# Patient Record
(demographics unavailable — no encounter records)

---

## 2025-06-03 NOTE — PROCEDURE
[Posterior Lesion] : posterior lesion [Anterior rhinoscopy insufficient to account for symptoms] : anterior rhinoscopy insufficient to account for symptoms [Topical Lidocaine] : topical lidocaine [Oxymetazoline HCl] : oxymetazoline HCl [Flexible Endoscope] : examined with the flexible endoscope [Serial Number: ___] : Serial Number: [unfilled] [Congested] : congested [Deviated to the Rt] : deviated to the right [] : purulence present bilateral [Normal] : the nasopharynx was normal [de-identified] : done due to inability to see posterior nasal cavity with speculum- purulence from omu on r [FreeTextEntry6] : done for inability to visualize posterior nasal cavity with speculum

## 2025-06-03 NOTE — PROCEDURE
[Posterior Lesion] : posterior lesion [Anterior rhinoscopy insufficient to account for symptoms] : anterior rhinoscopy insufficient to account for symptoms [Topical Lidocaine] : topical lidocaine [Oxymetazoline HCl] : oxymetazoline HCl [Flexible Endoscope] : examined with the flexible endoscope [Serial Number: ___] : Serial Number: [unfilled] [Congested] : congested [Deviated to the Rt] : deviated to the right [] : purulence present bilateral [Normal] : the nasopharynx was normal [de-identified] : done due to inability to see posterior nasal cavity with speculum- purulence from omu on r [FreeTextEntry6] : done for inability to visualize posterior nasal cavity with speculum

## 2025-06-03 NOTE — HISTORY OF PRESENT ILLNESS
[de-identified] : 2 yr & 9 month F/U visit for this 91yo F with h/o b snhl, chronic sinusitis, allergic rhinitis, & cerumen impaction. She c/o r eye pain if touches above and around r eye. She has not had covid test. Nose is obstructed. Does not feel she has a uri. Saw her ophthalmologist and was told eyes were normal and vision was unaffected. She was away on a trip with a person who had acute uri.

## 2025-06-03 NOTE — ASSESSMENT
[FreeTextEntry1] : sinusitis augmentin flonase rtc with ct due to eye pain rec to go to e.r. if worsens elevate hob and to go to urgent care for covid, flu and upper resp pathogen testing after this visit - she agreed

## 2025-06-03 NOTE — PHYSICAL EXAM
[Nasal Endoscopy Performed] : nasal endoscopy was performed, see procedure section for findings [de-identified] : engorged [Midline] : trachea located in midline position [Normal] : no rashes [de-identified] : gait steady

## 2025-06-03 NOTE — PROCEDURE
[Posterior Lesion] : posterior lesion [Anterior rhinoscopy insufficient to account for symptoms] : anterior rhinoscopy insufficient to account for symptoms [Topical Lidocaine] : topical lidocaine [Oxymetazoline HCl] : oxymetazoline HCl [Flexible Endoscope] : examined with the flexible endoscope [Serial Number: ___] : Serial Number: [unfilled] [Congested] : congested [Deviated to the Rt] : deviated to the right [] : purulence present bilateral [Normal] : the nasopharynx was normal [de-identified] : done due to inability to see posterior nasal cavity with speculum- purulence from omu on r [FreeTextEntry6] : done for inability to visualize posterior nasal cavity with speculum

## 2025-06-03 NOTE — PHYSICAL EXAM
[Nasal Endoscopy Performed] : nasal endoscopy was performed, see procedure section for findings [de-identified] : engorged [Midline] : trachea located in midline position [Normal] : no rashes [de-identified] : gait steady

## 2025-06-03 NOTE — PHYSICAL EXAM
[Nasal Endoscopy Performed] : nasal endoscopy was performed, see procedure section for findings [de-identified] : engorged [Midline] : trachea located in midline position [Normal] : no rashes [de-identified] : gait steady

## 2025-06-03 NOTE — HISTORY OF PRESENT ILLNESS
[de-identified] : 2 yr & 9 month F/U visit for this 91yo F with h/o b snhl, chronic sinusitis, allergic rhinitis, & cerumen impaction. She c/o r eye pain if touches above and around r eye. She has not had covid test. Nose is obstructed. Does not feel she has a uri. Saw her ophthalmologist and was told eyes were normal and vision was unaffected. She was away on a trip with a person who had acute uri.

## 2025-06-03 NOTE — HISTORY OF PRESENT ILLNESS
[de-identified] : 2 yr & 9 month F/U visit for this 89yo F with h/o b snhl, chronic sinusitis, allergic rhinitis, & cerumen impaction. She c/o r eye pain if touches above and around r eye. She has not had covid test. Nose is obstructed. Does not feel she has a uri. Saw her ophthalmologist and was told eyes were normal and vision was unaffected. She was away on a trip with a person who had acute uri.

## 2025-06-14 NOTE — ASSESSMENT
[FreeTextEntry1] : 1. likely odontogenic cause of sinusitis r maxillary with possible foreign body noted to r maxillary -CT: opacification r maxillary sinus, bony dehiscence inferior and medial right maxillary sinus bony wall, oral antral fistulas, left DNS - images reviewed with pt -significant clinical improvement after augmentin and she states she is no longer symptomatic. After reviewing ct more antibiotic recommended and explained she would typically need to have surgical procedure to clear implant, fistula, and possibly sinus to clear dz. She absolutely refused more abx. Importance of seeing OMFS and reason stressed -she said she would not promise she would agree to a surgical procedure but agreed to see OMFS for consultation; she said she could not get to original surgeon performing implant in [place]. Recommended to continue flonase and to call or go to ER if sx worsen. Eye risk explained as she had sx there when acutely infected. Elevate hob.  asst asked to set up appt with Dr Costa and follow up with me after  2. b cerumen impaction -b copious cerumen noted, atraumatically removed with suction, b tm intact -ears felt better -avoid qtip use  3. snhl -audio exam: hf snhl AU, type A tymps AU -results reviewed with patient -advised arpita

## 2025-06-14 NOTE — PHYSICAL EXAM
[Nasal Endoscopy Performed] : nasal endoscopy was performed, see procedure section for findings [Midline] : trachea located in midline position [Normal] : assessment of respiratory effort is normal [de-identified] : b copious cerumen noted, atraumatically removed with suction, b tm intact

## 2025-06-14 NOTE — HISTORY OF PRESENT ILLNESS
[de-identified] : 10 day follow up visit for this 89 y/o F with h/o b snhl, chronic sinusitis, allergic rhinitis and recurrent cerumen impaction. She was dx'd with sinusitis during last visit and tx'd with augmentin with relief of r eye pain; she feels her sinusitis has resolved. She also is concerned of wax buildup bilaterally - has applied debrox prior to visit. She is interested in obtaining hearing aids. She has h/o dental implants several years ago - Dr. Chacorta Weeks (periodontist),

## 2025-06-14 NOTE — HISTORY OF PRESENT ILLNESS
[de-identified] : 10 day follow up visit for this 89 y/o F with h/o b snhl, chronic sinusitis, allergic rhinitis and recurrent cerumen impaction. She was dx'd with sinusitis during last visit and tx'd with augmentin with relief of r eye pain; she feels her sinusitis has resolved. She also is concerned of wax buildup bilaterally - has applied debrox prior to visit. She is interested in obtaining hearing aids. She has h/o dental implants several years ago - Dr. Chacorta Weeks (periodontist),

## 2025-06-14 NOTE — DATA REVIEWED
[de-identified] : b symm hf snhl some progression from 2016 reviewed with pt tymps As reviewed with pt [de-identified] : ct images reviewed with pt 1. Severe opacification right maxillary sinus with superimposed, moderate-sized right maxillary sinus air-fluid level and scattered, probable sinoliths, bony thickening of the right maxillary sinus bony walls suggesting sequelae of chronic sinusitis, focal bony dehiscence inferior and medial right maxillary sinus bony walls, oral antral fistulas and likely source of odontogenic, right maxillary sinusitis as above. 2. Moderate right and mild/moderate left ethmoid, mild/moderate left and mild right sphenoid sinus opacification without evidence of acute sinusitis. 3. Leftward nasal septal deviation, moderate anterior nasal septal and moderate left greater than right inferior nasal turbinate hypertrophy and nonspecific moderate right lateral middle nasal cavity mucosal thickening and normal nasal cavity anatomic variants.

## 2025-06-14 NOTE — DATA REVIEWED
[de-identified] : b symm hf snhl some progression from 2016 reviewed with pt tymps As reviewed with pt [de-identified] : ct images reviewed with pt 1. Severe opacification right maxillary sinus with superimposed, moderate-sized right maxillary sinus air-fluid level and scattered, probable sinoliths, bony thickening of the right maxillary sinus bony walls suggesting sequelae of chronic sinusitis, focal bony dehiscence inferior and medial right maxillary sinus bony walls, oral antral fistulas and likely source of odontogenic, right maxillary sinusitis as above. 2. Moderate right and mild/moderate left ethmoid, mild/moderate left and mild right sphenoid sinus opacification without evidence of acute sinusitis. 3. Leftward nasal septal deviation, moderate anterior nasal septal and moderate left greater than right inferior nasal turbinate hypertrophy and nonspecific moderate right lateral middle nasal cavity mucosal thickening and normal nasal cavity anatomic variants.

## 2025-06-14 NOTE — ASSESSMENT
[FreeTextEntry1] : 1. likely odontogenic cause of sinusitis r maxillary with possible foreign body noted to r maxillary -CT: opacification r maxillary sinus, bony dehiscence inferior and medial right maxillary sinus bony wall, oral antral fistulas, left DNS - images reviewed with pt -significant clinical improvement after augmentin and she states she is no longer symptomatic. After reviewing ct more antibiotic recommended and explained she would typically need to have surgical procedure to clear implant, fistula, and possibly sinus to clear dz. She absolutely refused more abx. Importance of seeing OMFS and reason stressed -she said she would not promise she would agree to a surgical procedure but agreed to see OMFS for consultation; she said she could not get to original surgeon performing implant in [place]. Recommended to continue flonase and to call or go to ER if sx worsen. Eye risk explained as she had sx there when acutely infected. Elevate hob.  asst asked to set up appt with Dr Costa and follow up with me after  2. b cerumen impaction -b copious cerumen noted, atraumatically removed with suction, b tm intact -ears felt better -avoid qtip use  3. snhl -audio exam: hf snhl AU, type A tymps AU -results reviewed with patient -advised arpita

## 2025-06-14 NOTE — PROCEDURE
[Posterior Lesion] : posterior lesion [Anterior rhinoscopy insufficient to account for symptoms] : anterior rhinoscopy insufficient to account for symptoms [Topical Lidocaine] : topical lidocaine [Oxymetazoline HCl] : oxymetazoline HCl [Flexible Endoscope] : examined with the flexible endoscope [Serial Number: ___] : Serial Number: [unfilled] [Normal] : the nasopharynx was normal [de-identified] : performed to recheck omus purulence seen at last visit and significant dz on ct - could not see with speculum [Cerumen Impaction] : Cerumen Impaction [Same] : same as the Pre Op Dx. [] : Removal of Cerumen [FreeTextEntry6] : b copious cerumen impaction removed atraumatically with suction, duran nl

## 2025-06-14 NOTE — PHYSICAL EXAM
[Nasal Endoscopy Performed] : nasal endoscopy was performed, see procedure section for findings [Midline] : trachea located in midline position [Normal] : assessment of respiratory effort is normal [de-identified] : b copious cerumen noted, atraumatically removed with suction, b tm intact

## 2025-06-14 NOTE — PROCEDURE
[Posterior Lesion] : posterior lesion [Anterior rhinoscopy insufficient to account for symptoms] : anterior rhinoscopy insufficient to account for symptoms [Topical Lidocaine] : topical lidocaine [Oxymetazoline HCl] : oxymetazoline HCl [Flexible Endoscope] : examined with the flexible endoscope [Serial Number: ___] : Serial Number: [unfilled] [Normal] : the nasopharynx was normal [de-identified] : performed to recheck omus purulence seen at last visit and significant dz on ct - could not see with speculum [Cerumen Impaction] : Cerumen Impaction [Same] : same as the Pre Op Dx. [] : Removal of Cerumen [FreeTextEntry6] : b copious cerumen impaction removed atraumatically with suction, duran nl